# Patient Record
Sex: FEMALE | Race: WHITE | NOT HISPANIC OR LATINO | Employment: UNEMPLOYED | RURAL
[De-identification: names, ages, dates, MRNs, and addresses within clinical notes are randomized per-mention and may not be internally consistent; named-entity substitution may affect disease eponyms.]

---

## 2022-05-11 NOTE — PROGRESS NOTES
[facility] Cardiology   Consult  Rosaura Younger  1995    VISIT DATE:  05/12/22    PCP:   Tello South MD  [address] [facility] [address]        CC:  Hypertension, Chest Pain, Irregular Heart Beat, and Edema      Problem List:  1.  Asthma  2.  GERD  3.  Arthritis    -normal TSH and basic labs  -rheumatologic labs pending    Recent Holter monitor without significant arrhythmia    History of Present Illness:  Rosaura Younger  Is a 26 y.o. female with pertinent cardiac history detailed above.  Patient c/o dull ache in her heart.  Also c/o feeling a catch in her heart.  Resting can help the pain.  It I no always provoked with exertion.  This has been bothering her for a few months.  She also c/o heart racing intermittently.  Average episode of chest pains last 15-20 minutes.  In office today EKG shows T wave inversions in the inferior leads and nonspecific T wave flattening.  In no active distress she did wear a Holter monitor recently that showed sinus rhythm and sinus tachycardia.      There are no problems to display for this patient.      Allergies   Allergen Reactions   • Penicillins Other (See Comments)     CHILDHOOD       Social History     Socioeconomic History   • Marital status: Single   Tobacco Use   • Smoking status: Never Smoker   • Smokeless tobacco: Never Used   Substance and Sexual Activity   • Alcohol use: Not Currently   • Drug use: Not Currently   • Sexual activity: Defer       Family History   Problem Relation Age of Onset   • Cancer Mother    • No Known Problems Sister    • No Known Problems Sister    • No Known Problems Brother    • No Known Problems Brother        Current Medications:    Current Outpatient Medications:   •  Acetaminophen (ARTHRITIS PAIN PO), Take 650 mg by mouth Every Night., Disp: , Rfl:   •  cetirizine (zyrTEC) 10 MG tablet, Take 10 mg by mouth Daily., Disp: , Rfl:   •  ipratropium-albuterol (COMBIVENT RESPIMAT)  " MCG/ACT inhaler, Inhale 1 puff As Needed for Wheezing., Disp: , Rfl:   •  pantoprazole (PROTONIX) 40 MG EC tablet, Take 40 mg by mouth Daily., Disp: , Rfl:   •  Probiotic Product (PROBIOTIC ADVANCED PO), Take  by mouth Daily., Disp: , Rfl:   •  tiZANidine (ZANAFLEX) 4 MG tablet, Take 4 mg by mouth Daily., Disp: , Rfl:      Review of Systems   Cardiovascular: Positive for chest pain and palpitations.   Musculoskeletal: Positive for arthritis.       Vitals:    05/12/22 1340   BP: 127/90   BP Location: Right arm   Patient Position: Sitting   Pulse: 66   SpO2: 96%   Weight: 83.4 kg (183 lb 12.8 oz)   Height: 162.6 cm (64\")       Physical Exam  Constitutional:       Appearance: Normal appearance.   Neck:      Vascular: No carotid bruit.   Cardiovascular:      Rate and Rhythm: Normal rate and regular rhythm.      Pulses: Normal pulses.      Heart sounds: Normal heart sounds.   Pulmonary:      Effort: Pulmonary effort is normal.      Breath sounds: Normal breath sounds.   Musculoskeletal:      Right lower leg: No edema.      Left lower leg: No edema.   Neurological:      Mental Status: She is alert.         Diagnostic Data:    ECG 12 Lead    Date/Time: 5/12/2022 2:19 PM  Performed by: Chetan Garrison MD  Authorized by: Chetan Garrison MD   Comparison: not compared with previous ECG   Previous ECG: no previous ECG available  Rhythm: sinus rhythm  Rate: normal  BPM: 76  T inversion: III and aVF  QRS axis: normal  Other findings: non-specific ST-T wave changes          No results found for: CHLPL, TRIG, HDL, LDLDIRECT  No results found for: GLUCOSE, BUN, CREATININE, NA, K, CL, CO2, CREATININE, BUN  No results found for: HGBA1C  No results found for: WBC, HGB, HCT, PLT    Assessment:   Diagnosis Plan   1. Chest pain, unspecified type  Adult Stress Echo W/ Cont or Stress Agent if Necessary Per Protocol       Plan:      1.  Chest pain and palpitations  -Patient  has had normal baseline labs and TSH  -She " is getting a rheumatologic work-up for arthritis  -Holter monitor did not show any significant arrhythmias  -Since she has chest pain and nonspecific ST changes on EKG we will further evaluate with a stress echo    Contact her with the results and follow-up in 3 to 4 months          Chetan Garrison MD formerly [facility]

## 2022-05-12 ENCOUNTER — OFFICE VISIT (OUTPATIENT)
Dept: CARDIOLOGY | Facility: CLINIC | Age: 27
End: 2022-05-12

## 2022-05-12 VITALS
DIASTOLIC BLOOD PRESSURE: 90 MMHG | BODY MASS INDEX: 31.38 KG/M2 | WEIGHT: 183.8 LBS | SYSTOLIC BLOOD PRESSURE: 127 MMHG | OXYGEN SATURATION: 96 % | HEART RATE: 66 BPM | HEIGHT: 64 IN

## 2022-05-12 DIAGNOSIS — R07.9 CHEST PAIN, UNSPECIFIED TYPE: Primary | ICD-10-CM

## 2022-05-12 PROCEDURE — 99203 OFFICE O/P NEW LOW 30 MIN: CPT | Performed by: INTERNAL MEDICINE

## 2022-05-12 PROCEDURE — 93000 ELECTROCARDIOGRAM COMPLETE: CPT | Performed by: INTERNAL MEDICINE

## 2022-05-12 RX ORDER — CETIRIZINE HYDROCHLORIDE 10 MG/1
10 TABLET ORAL DAILY
COMMUNITY

## 2022-05-12 RX ORDER — TIZANIDINE 4 MG/1
4 TABLET ORAL DAILY
COMMUNITY
Start: 2022-04-18

## 2022-05-12 RX ORDER — PANTOPRAZOLE SODIUM 40 MG/1
40 TABLET, DELAYED RELEASE ORAL DAILY
COMMUNITY
Start: 2022-04-22

## 2022-06-09 DIAGNOSIS — R07.9 CHEST PAIN, UNSPECIFIED TYPE: Primary | ICD-10-CM

## 2022-06-15 ENCOUNTER — APPOINTMENT (OUTPATIENT)
Dept: CARDIOLOGY | Facility: HOSPITAL | Age: 27
End: 2022-06-15

## 2022-06-15 ENCOUNTER — HOSPITAL ENCOUNTER (OUTPATIENT)
Dept: CARDIOLOGY | Facility: HOSPITAL | Age: 27
Discharge: HOME | End: 2022-06-15
Admitting: INTERNAL MEDICINE

## 2022-06-15 VITALS
DIASTOLIC BLOOD PRESSURE: 77 MMHG | OXYGEN SATURATION: 97 % | HEART RATE: 103 BPM | BODY MASS INDEX: 31.24 KG/M2 | SYSTOLIC BLOOD PRESSURE: 117 MMHG | WEIGHT: 183 LBS | HEIGHT: 64 IN

## 2022-06-15 DIAGNOSIS — R07.9 CHEST PAIN, UNSPECIFIED TYPE: ICD-10-CM

## 2022-06-15 PROCEDURE — 93017 CV STRESS TEST TRACING ONLY: CPT

## 2022-06-15 PROCEDURE — 93018 CV STRESS TEST I&R ONLY: CPT | Performed by: INTERNAL MEDICINE

## 2022-06-16 LAB
BH CV STRESS BP STAGE 1: NORMAL
BH CV STRESS DURATION MIN STAGE 1: 3
BH CV STRESS DURATION MIN STAGE 2: 1
BH CV STRESS DURATION SEC STAGE 1: 0
BH CV STRESS DURATION SEC STAGE 2: 15
BH CV STRESS GRADE STAGE 1: 10
BH CV STRESS GRADE STAGE 2: 12
BH CV STRESS HR STAGE 1: 146
BH CV STRESS HR STAGE 2: 162
BH CV STRESS METS STAGE 1: 5
BH CV STRESS METS STAGE 2: 7.5
BH CV STRESS O2 STAGE 1: 97
BH CV STRESS O2 STAGE 2: 93
BH CV STRESS PROTOCOL 1: NORMAL
BH CV STRESS RECOVERY BP: NORMAL MMHG
BH CV STRESS RECOVERY HR: 92 BPM
BH CV STRESS RECOVERY O2: 96 %
BH CV STRESS SPEED STAGE 1: 1.7
BH CV STRESS SPEED STAGE 2: 2.5
BH CV STRESS STAGE 1: 1
BH CV STRESS STAGE 2: 2
MAXIMAL PREDICTED HEART RATE: 193 BPM
PERCENT MAX PREDICTED HR: 86.01 %
STRESS BASELINE BP: NORMAL MMHG
STRESS BASELINE HR: 89 BPM
STRESS O2 SAT REST: 96 %
STRESS PERCENT HR: 101 %
STRESS POST ESTIMATED WORKLOAD: 6.1 METS
STRESS POST EXERCISE DUR MIN: 4 MIN
STRESS POST EXERCISE DUR SEC: 15 SEC
STRESS POST O2 SAT PEAK: 93 %
STRESS POST PEAK BP: NORMAL MMHG
STRESS POST PEAK HR: 166 BPM
STRESS TARGET HR: 164 BPM

## 2022-06-20 ENCOUNTER — TELEPHONE (OUTPATIENT)
Dept: CARDIOLOGY | Facility: CLINIC | Age: 27
End: 2022-06-20

## 2022-06-20 NOTE — TELEPHONE ENCOUNTER
Her treadmill was read and finalized. Can you let me know if you have any concerns or further recommendations and I will those to her? Stress/echo was not completed. [employer] made us switch the order due to coverage. Thank you!

## 2022-09-08 ENCOUNTER — OFFICE VISIT (OUTPATIENT)
Dept: CARDIOLOGY | Facility: CLINIC | Age: 27
End: 2022-09-08

## 2022-09-08 VITALS
OXYGEN SATURATION: 98 % | HEIGHT: 64 IN | WEIGHT: 182 LBS | DIASTOLIC BLOOD PRESSURE: 80 MMHG | HEART RATE: 86 BPM | SYSTOLIC BLOOD PRESSURE: 124 MMHG | BODY MASS INDEX: 31.07 KG/M2

## 2022-09-08 DIAGNOSIS — R07.9 CHEST PAIN, UNSPECIFIED TYPE: Primary | ICD-10-CM

## 2022-09-08 PROCEDURE — 99214 OFFICE O/P EST MOD 30 MIN: CPT | Performed by: INTERNAL MEDICINE

## 2022-09-08 NOTE — PROGRESS NOTES
[facility] Cardiology  Follow Up Visit  Rosaura Younger  1995    VISIT DATE:  09/08/22    PCP:   Tello South [facility] [address]          CC:  Follow-up      Problem List:  1.  Asthma  2.  GERD  3.  Arthritis  4.  Fibromyalgia     -normal TSH and basic labs       Prior Holter monitor without significant arrhythmia    GXT June 2022  · The exercise ECG stress test was negative for ECG evidence of myocardial ischemia. The patient did not have chest pain with exercise stress. The Duke treadmill score was low at 4.3 due to the patient's exercise intolerance. The patient's exercise capacity was moderately to severely decreased (ERICA 53%). There was an exaggerated heart rate and hypertensive response to stress.  · Impressions are consistent with an intermediate risk stress test based on the Duke treadmill score.         History of Present Illness:  Rosaura Younger  Is a 26 y.o. female with pertinent cardiac history detailed above.  At initial visit patient was complaining of a dull ache in her chest.  She had baseline nonspecific ST changes.  A stress echo was planned but insurance would not cover so she was converted to a GXT.  She did not have any chest pain during her stress test and she had no ischemic EKG changes.  She did have a hypertensive response to exercise and impaired exercise capacity.  She contributed to decreased exercise tolerance to her asthma.  On follow-up today she reports feeling improved not having further chest pain.  She uses a combination of inhaler/nebulizer for asthma.  After rheumatology evaluation she was diagnosed with osteoarthritis as well as fibromyalgia.  Her resting blood pressure has not been significantly elevated.  Does not require medication at this time      There are no problems to display for this patient.      Allergies   Allergen Reactions   • Penicillins Rash       Social History     Socioeconomic  "History   • Marital status: Single   Tobacco Use   • Smoking status: Never Smoker   • Smokeless tobacco: Never Used   Substance and Sexual Activity   • Alcohol use: Not Currently   • Drug use: Not Currently   • Sexual activity: Defer       Family History   Problem Relation Age of Onset   • Cancer Mother    • No Known Problems Sister    • No Known Problems Sister    • No Known Problems Brother    • No Known Problems Brother        Current Medications:    Current Outpatient Medications:   •  Acetaminophen (ARTHRITIS PAIN PO), Take 650 mg by mouth Every Night., Disp: , Rfl:   •  cetirizine (zyrTEC) 10 MG tablet, Take 10 mg by mouth Daily., Disp: , Rfl:   •  ipratropium-albuterol (COMBIVENT RESPIMAT)  MCG/ACT inhaler, Inhale 1 puff As Needed for Wheezing., Disp: , Rfl:   •  pantoprazole (PROTONIX) 40 MG EC tablet, Take 40 mg by mouth Daily., Disp: , Rfl:   •  Probiotic Product (PROBIOTIC ADVANCED PO), Take  by mouth Daily., Disp: , Rfl:   •  tiZANidine (ZANAFLEX) 4 MG tablet, Take 4 mg by mouth Daily., Disp: , Rfl:      Review of Systems   Cardiovascular: Negative for chest pain and palpitations.   Respiratory: Positive for shortness of breath.        Vitals:    09/08/22 1356   BP: 124/80   BP Location: Left arm   Patient Position: Sitting   Pulse: 86   SpO2: 98%   Weight: 82.6 kg (182 lb)   Height: 162.6 cm (64\")       Physical Exam  Constitutional:       Appearance: Normal appearance.   Cardiovascular:      Rate and Rhythm: Normal rate and regular rhythm.      Pulses: Normal pulses.      Heart sounds: Normal heart sounds.   Musculoskeletal:      Right lower leg: No edema.      Left lower leg: No edema.   Skin:     General: Skin is warm and dry.   Neurological:      General: No focal deficit present.      Mental Status: She is alert.         Diagnostic Data:  Procedures  No results found for: CHLPL, TRIG, HDL, LDLDIRECT  No results found for: GLUCOSE, BUN, CREATININE, NA, K, CL, CO2, CREATININE, BUN  No results " found for: HGBA1C  No results found for: WBC, HGB, HCT, PLT    Assessment:  No diagnosis found.    Plan:      1.  Chest pain and palpitations  -Patient  has had normal baseline labs and TSH  -She reports diagnosed with fibromyalgia after rheumatology work-up  -Holter monitor did not show any significant arrhythmias  -GXT showed no evidence of ischemia, she had no chest pain.  She had impaired exercise tolerance which was thought to be secondary to asthma    She did have a high blood pressure response when exercising but resting BP has been normal.  Would observe off of medication at this time.    This time appears stable from a cardiology standpoint and she can follow with Dr. South.  She will follow-up with us as needed.        Chetan Garrison MD [facility]

## 2025-05-06 ENCOUNTER — APPOINTMENT (OUTPATIENT)
Dept: GENERAL RADIOLOGY | Facility: HOSPITAL | Age: 30
End: 2025-05-06
Payer: COMMERCIAL

## 2025-05-06 ENCOUNTER — HOSPITAL ENCOUNTER (EMERGENCY)
Facility: HOSPITAL | Age: 30
Discharge: HOME | End: 2025-05-06
Attending: STUDENT IN AN ORGANIZED HEALTH CARE EDUCATION/TRAINING PROGRAM | Admitting: STUDENT IN AN ORGANIZED HEALTH CARE EDUCATION/TRAINING PROGRAM
Payer: COMMERCIAL

## 2025-05-06 VITALS
DIASTOLIC BLOOD PRESSURE: 84 MMHG | HEART RATE: 71 BPM | TEMPERATURE: 97.7 F | RESPIRATION RATE: 18 BRPM | OXYGEN SATURATION: 99 % | WEIGHT: 192.6 LBS | HEIGHT: 64 IN | SYSTOLIC BLOOD PRESSURE: 125 MMHG | BODY MASS INDEX: 32.88 KG/M2

## 2025-05-06 DIAGNOSIS — R07.9 CHEST PAIN, UNSPECIFIED TYPE: Primary | ICD-10-CM

## 2025-05-06 LAB
ALBUMIN SERPL-MCNC: 4.4 G/DL (ref 3.5–5.2)
ALBUMIN/GLOB SERPL: 1.5 G/DL
ALP SERPL-CCNC: 77 U/L (ref 39–117)
ALT SERPL W P-5'-P-CCNC: 36 U/L (ref 1–33)
ANION GAP SERPL CALCULATED.3IONS-SCNC: 9.7 MMOL/L (ref 5–15)
AST SERPL-CCNC: 29 U/L (ref 1–32)
BASOPHILS # BLD AUTO: 0.06 10*3/MM3 (ref 0–0.2)
BASOPHILS NFR BLD AUTO: 0.6 % (ref 0–1.5)
BILIRUB SERPL-MCNC: 1 MG/DL (ref 0–1.2)
BUN SERPL-MCNC: 13 MG/DL (ref 6–20)
BUN/CREAT SERPL: 16.7 (ref 7–25)
CALCIUM SPEC-SCNC: 9.4 MG/DL (ref 8.6–10.5)
CHLORIDE SERPL-SCNC: 103 MMOL/L (ref 98–107)
CO2 SERPL-SCNC: 24.3 MMOL/L (ref 22–29)
CREAT SERPL-MCNC: 0.78 MG/DL (ref 0.57–1)
D DIMER PPP FEU-MCNC: <0.27 MCGFEU/ML (ref 0–0.5)
DEPRECATED RDW RBC AUTO: 39.2 FL (ref 37–54)
EGFRCR SERPLBLD CKD-EPI 2021: 104.9 ML/MIN/1.73
EOSINOPHIL # BLD AUTO: 0.15 10*3/MM3 (ref 0–0.4)
EOSINOPHIL NFR BLD AUTO: 1.5 % (ref 0.3–6.2)
ERYTHROCYTE [DISTWIDTH] IN BLOOD BY AUTOMATED COUNT: 12.8 % (ref 12.3–15.4)
FLUAV RNA RESP QL NAA+PROBE: NOT DETECTED
FLUBV RNA RESP QL NAA+PROBE: NOT DETECTED
GLOBULIN UR ELPH-MCNC: 3 GM/DL
GLUCOSE SERPL-MCNC: 98 MG/DL (ref 65–99)
HCT VFR BLD AUTO: 41.9 % (ref 34–46.6)
HGB BLD-MCNC: 14.3 G/DL (ref 12–15.9)
HOLD SPECIMEN: NORMAL
HOLD SPECIMEN: NORMAL
IMM GRANULOCYTES # BLD AUTO: 0.05 10*3/MM3 (ref 0–0.05)
IMM GRANULOCYTES NFR BLD AUTO: 0.5 % (ref 0–0.5)
LYMPHOCYTES # BLD AUTO: 2.98 10*3/MM3 (ref 0.7–3.1)
LYMPHOCYTES NFR BLD AUTO: 29.2 % (ref 19.6–45.3)
MAGNESIUM SERPL-MCNC: 1.9 MG/DL (ref 1.6–2.6)
MCH RBC QN AUTO: 29.1 PG (ref 26.6–33)
MCHC RBC AUTO-ENTMCNC: 34.1 G/DL (ref 31.5–35.7)
MCV RBC AUTO: 85.2 FL (ref 79–97)
MONOCYTES # BLD AUTO: 0.94 10*3/MM3 (ref 0.1–0.9)
MONOCYTES NFR BLD AUTO: 9.2 % (ref 5–12)
NEUTROPHILS NFR BLD AUTO: 59 % (ref 42.7–76)
NEUTROPHILS NFR BLD AUTO: 6.01 10*3/MM3 (ref 1.7–7)
NRBC BLD AUTO-RTO: 0 /100 WBC (ref 0–0.2)
PLATELET # BLD AUTO: 362 10*3/MM3 (ref 140–450)
PMV BLD AUTO: 10.3 FL (ref 6–12)
POTASSIUM SERPL-SCNC: 3.8 MMOL/L (ref 3.5–5.2)
PROT SERPL-MCNC: 7.4 G/DL (ref 6–8.5)
RBC # BLD AUTO: 4.92 10*6/MM3 (ref 3.77–5.28)
SARS-COV-2 RNA RESP QL NAA+PROBE: NOT DETECTED
SODIUM SERPL-SCNC: 137 MMOL/L (ref 136–145)
TROPONIN T SERPL HS-MCNC: <6 NG/L
WBC NRBC COR # BLD AUTO: 10.19 10*3/MM3 (ref 3.4–10.8)
WHOLE BLOOD HOLD COAG: NORMAL
WHOLE BLOOD HOLD SPECIMEN: NORMAL

## 2025-05-06 PROCEDURE — 80053 COMPREHEN METABOLIC PANEL: CPT | Performed by: STUDENT IN AN ORGANIZED HEALTH CARE EDUCATION/TRAINING PROGRAM

## 2025-05-06 PROCEDURE — 84484 ASSAY OF TROPONIN QUANT: CPT | Performed by: STUDENT IN AN ORGANIZED HEALTH CARE EDUCATION/TRAINING PROGRAM

## 2025-05-06 PROCEDURE — 25010000002 KETOROLAC TROMETHAMINE PER 15 MG

## 2025-05-06 PROCEDURE — 83735 ASSAY OF MAGNESIUM: CPT

## 2025-05-06 PROCEDURE — 71045 X-RAY EXAM CHEST 1 VIEW: CPT

## 2025-05-06 PROCEDURE — 85379 FIBRIN DEGRADATION QUANT: CPT

## 2025-05-06 PROCEDURE — 99284 EMERGENCY DEPT VISIT MOD MDM: CPT | Performed by: STUDENT IN AN ORGANIZED HEALTH CARE EDUCATION/TRAINING PROGRAM

## 2025-05-06 PROCEDURE — 87636 SARSCOV2 & INF A&B AMP PRB: CPT

## 2025-05-06 PROCEDURE — 36415 COLL VENOUS BLD VENIPUNCTURE: CPT

## 2025-05-06 PROCEDURE — 93005 ELECTROCARDIOGRAM TRACING: CPT | Performed by: STUDENT IN AN ORGANIZED HEALTH CARE EDUCATION/TRAINING PROGRAM

## 2025-05-06 PROCEDURE — 85025 COMPLETE CBC W/AUTO DIFF WBC: CPT | Performed by: STUDENT IN AN ORGANIZED HEALTH CARE EDUCATION/TRAINING PROGRAM

## 2025-05-06 PROCEDURE — 96374 THER/PROPH/DIAG INJ IV PUSH: CPT

## 2025-05-06 RX ORDER — KETOROLAC TROMETHAMINE 30 MG/ML
15 INJECTION, SOLUTION INTRAMUSCULAR; INTRAVENOUS ONCE
Status: COMPLETED | OUTPATIENT
Start: 2025-05-06 | End: 2025-05-06

## 2025-05-06 RX ORDER — ASPIRIN 325 MG
325 TABLET ORAL ONCE
Status: DISCONTINUED | OUTPATIENT
Start: 2025-05-06 | End: 2025-05-06

## 2025-05-06 RX ORDER — SODIUM CHLORIDE 0.9 % (FLUSH) 0.9 %
10 SYRINGE (ML) INJECTION AS NEEDED
Status: DISCONTINUED | OUTPATIENT
Start: 2025-05-06 | End: 2025-05-06 | Stop reason: HOSPADM

## 2025-05-06 RX ORDER — NAPROXEN 500 MG/1
500 TABLET ORAL 2 TIMES DAILY PRN
Qty: 20 TABLET | Refills: 0 | Status: SHIPPED | OUTPATIENT
Start: 2025-05-06

## 2025-05-06 RX ADMIN — KETOROLAC TROMETHAMINE 15 MG: 30 INJECTION, SOLUTION INTRAMUSCULAR; INTRAVENOUS at 10:32

## 2025-05-06 NOTE — ED PROVIDER NOTES
Subjective  History of Present Illness:    Patient is a 29-year-old female presented for evaluation of left-sided chest discomfort, she is currently being treated for UTI and reports that those symptoms have significantly improved, she reports that she has had left-sided chest discomfort ongoing since Saturday, intermittent in nature, not constant, worse with inhalation and breathing.  Denies any significant cough but does report some mild nasal congestion.  No fevers, no unilateral leg tenderness or swelling, she is not on any birth control or hormonal therapy.  Denies any prior history of DVT or PE.  Reports a dull ache when at rest, made worse with inspiration.  No traumatic injuries, no heavy lifting, not exacerbated by movement.  No numbness no tingling.      Nurses Notes reviewed and agree, including vitals, allergies, social history and prior medical history.     REVIEW OF SYSTEMS: All systems reviewed and not pertinent unless noted.  Review of Systems   Constitutional:  Negative for fever.   HENT:  Positive for congestion.    Respiratory:  Negative for cough and shortness of breath.    Cardiovascular:  Positive for chest pain. Negative for leg swelling.   Gastrointestinal:  Negative for abdominal pain, diarrhea, nausea and vomiting.   Musculoskeletal:  Negative for back pain.   All other systems reviewed and are negative.      Past Medical History:   Diagnosis Date    Abnormal ECG     Acid reflux     Arthritis     Asthma     C. difficile colitis     Easy bruising     Stomach disorder        Allergies:    Penicillins      Past Surgical History:   Procedure Laterality Date    REDUCTION MAMMAPLASTY      TONSILLECTOMY      WISDOM TOOTH EXTRACTION           Social History     Socioeconomic History    Marital status: Single   Tobacco Use    Smoking status: Never    Smokeless tobacco: Never   Substance and Sexual Activity    Alcohol use: Not Currently    Drug use: Not Currently    Sexual activity: Defer  "        Family History   Problem Relation Age of Onset    Cancer Mother     No Known Problems Sister     No Known Problems Sister     No Known Problems Brother     No Known Problems Brother        Objective  Physical Exam:  /84   Pulse 90   Temp 97.7 °F (36.5 °C) (Oral)   Resp 18   Ht 162.6 cm (64\")   Wt 87.4 kg (192 lb 9.6 oz)   LMP 04/28/2025 (Approximate)   SpO2 99%   BMI 33.06 kg/m²      Physical Exam  Vitals and nursing note reviewed.   Constitutional:       General: She is not in acute distress.     Appearance: She is well-developed. She is not ill-appearing, toxic-appearing or diaphoretic.   HENT:      Head: Normocephalic and atraumatic.   Eyes:      Extraocular Movements: Extraocular movements intact.      Pupils: Pupils are equal, round, and reactive to light.   Cardiovascular:      Rate and Rhythm: Normal rate and regular rhythm.      Pulses:           Radial pulses are 2+ on the right side and 2+ on the left side.      Heart sounds: Normal heart sounds.   Pulmonary:      Effort: Pulmonary effort is normal. No tachypnea or respiratory distress.      Breath sounds: Normal breath sounds. No decreased breath sounds, wheezing, rhonchi or rales.   Chest:      Chest wall: No tenderness.   Abdominal:      Palpations: Abdomen is soft.   Musculoskeletal:         General: Normal range of motion.      Cervical back: Normal range of motion.      Right lower leg: No tenderness. No edema.      Left lower leg: No tenderness. No edema.   Skin:     General: Skin is warm and dry.      Capillary Refill: Capillary refill takes less than 2 seconds.   Neurological:      General: No focal deficit present.      Mental Status: She is alert and oriented to person, place, and time.      Cranial Nerves: No cranial nerve deficit.      Motor: No weakness.   Psychiatric:         Mood and Affect: Mood normal.         Behavior: Behavior normal.               Procedures    ED Course:    ED Course as of 05/06/25 1137 Tue May " 06, 2025   1035 EKG interpreted by me, normal sinus rhythm no concerning ST changes noted, rate of 76 [JE]   1115 D-Dimer, Quant: <0.27 [JR]      ED Course User Index  [JE] Huang Keith MD  [JR] Krish Smith PA-C       Lab Results (last 24 hours)       Procedure Component Value Units Date/Time    CBC & Differential [[id number]]  (Abnormal) Collected: 05/06/25 1025    Specimen: Blood Updated: 05/06/25 1032    Narrative:      The following orders were created for panel order CBC & Differential.  Procedure                               Abnormality         Status                     ---------                               -----------         ------                     CBC Auto Differential[[id number]]        Abnormal            Final result                 Please view results for these tests on the individual orders.    Comprehensive Metabolic Panel [[id number]]  (Abnormal) Collected: 05/06/25 1025    Specimen: Blood Updated: 05/06/25 1049     Glucose 98 mg/dL      BUN 13 mg/dL      Creatinine 0.78 mg/dL      Sodium 137 mmol/L      Potassium 3.8 mmol/L      Chloride 103 mmol/L      CO2 24.3 mmol/L      Calcium 9.4 mg/dL      Total Protein 7.4 g/dL      Albumin 4.4 g/dL      ALT (SGPT) 36 U/L      AST (SGOT) 29 U/L      Alkaline Phosphatase 77 U/L      Total Bilirubin 1.0 mg/dL      Globulin 3.0 gm/dL      A/G Ratio 1.5 g/dL      BUN/Creatinine Ratio 16.7     Anion Gap 9.7 mmol/L      eGFR 104.9 mL/min/1.73     Narrative:      GFR Categories in Chronic Kidney Disease (CKD)              GFR Category          GFR (mL/min/1.73)    Interpretation  G1                    90 or greater        Normal or high (1)  G2                    60-89                Mild decrease (1)  G3a                   45-59                Mild to moderate decrease  G3b                   30-44                Moderate to severe decrease  G4                    15-29                Severe decrease  G5                    14 or less            Kidney failure    (1)In the absence of evidence of kidney disease, neither GFR category G1 or G2 fulfill the criteria for CKD.    eGFR calculation 2021 CKD-EPI creatinine equation, which does not include race as a factor    High Sensitivity Troponin T [[id number]]  (Normal) Collected: 05/06/25 1025    Specimen: Blood Updated: 05/06/25 1051     HS Troponin T <6 ng/L     Narrative:      High Sensitive Troponin T Reference Range:  <14.0 ng/L- Negative Female for AMI  <22.0 ng/L- Negative Male for AMI  >=14 - Abnormal Female indicating possible myocardial injury.  >=22 - Abnormal Male indicating possible myocardial injury.   Clinicians would have to utilize clinical acumen, EKG, Troponin, and serial changes to determine if it is an Acute Myocardial Infarction or myocardial injury due to an underlying chronic condition.         CBC Auto Differential [[id number]]  (Abnormal) Collected: 05/06/25 1025    Specimen: Blood Updated: 05/06/25 1032     WBC 10.19 10*3/mm3      RBC 4.92 10*6/mm3      Hemoglobin 14.3 g/dL      Hematocrit 41.9 %      MCV 85.2 fL      MCH 29.1 pg      MCHC 34.1 g/dL      RDW 12.8 %      RDW-SD 39.2 fl      MPV 10.3 fL      Platelets 362 10*3/mm3      Neutrophil % 59.0 %      Lymphocyte % 29.2 %      Monocyte % 9.2 %      Eosinophil % 1.5 %      Basophil % 0.6 %      Immature Grans % 0.5 %      Neutrophils, Absolute 6.01 10*3/mm3      Lymphocytes, Absolute 2.98 10*3/mm3      Monocytes, Absolute 0.94 10*3/mm3      Eosinophils, Absolute 0.15 10*3/mm3      Basophils, Absolute 0.06 10*3/mm3      Immature Grans, Absolute 0.05 10*3/mm3      nRBC 0.0 /100 WBC     Magnesium [[id number]]  (Normal) Collected: 05/06/25 1025    Specimen: Blood Updated: 05/06/25 1122     Magnesium 1.9 mg/dL     D-dimer, Quantitative [[id number]]  (Normal) Collected: 05/06/25 1027    Specimen: Blood Updated: 05/06/25 1111     D-Dimer, Quantitative <0.27 MCGFEU/mL     Narrative:      According to the assay 's published  "package insert, a normal (<0.50 MCGFEU/mL) D-dimer result in conjunction with a non-high clinical probability assessment, excludes deep vein thrombosis (DVT) and pulmonary embolism (PE) with high sensitivity.    D-dimer values increase with age and this can make VTE exclusion of an older population difficult. To address this, the American College of Phys[employer], based on best available evidence and recent guidelines, recommends that clinicians use age-adjusted D-dimer thresholds in patients greater than 50 years of age with: a) a low probability of PE who do not meet all Pulmonary Embolism Rule Out Criteria, or b) in those with intermediate probability of PE.   The formula for an age-adjusted D-dimer cut-off is \"age/100\".  For example, a 60 year old patient would have an age-adjusted cut-off of 0.60 MCGFEU/mL and an 80 year old 0.80 MCGFEU/mL.    COVID-19 and FLU A/B PCR, 1 HR TAT - Swab, Nasopharynx [[id number]]  (Normal) Collected: 05/06/25 1034    Specimen: Swab from Nasopharynx Updated: 05/06/25 1128     COVID19 Not Detected     Influenza A PCR Not Detected     Influenza B PCR Not Detected    Narrative:      Fact sheet for providers: https://www.fda.gov/media/576993/download    Fact sheet for patients: https://www.fda.gov/media/832212/download    Test performed by PCR.             XR Chest 1 View  Result Date: 5/6/2025  PROCEDURE: XR CHEST 1 VW-  HISTORY: Chest Pain Triage Protocol, stabbing, left-sided intermittent chest pain for 2 to 3 days.  COMPARISON: None.  FINDINGS: The heart is normal in size. The lungs are clear. The mediastinum is unremarkable. There is no pneumothorax.  There are no acute osseous abnormalities. Mild elevation right hemidiaphragm noted.      Impression: No acute cardiopulmonary process.      This report was signed and finalized on 5/6/2025 11:00 AM by Natalie Salcedo MD.           MDM      Initial impression of presenting illness: 29-year-old female presented for evaluation of left-sided " chest pain, anterior.  Nonradiating.    DDX: includes but is not limited to: Pleurisy, PE, pneumothorax, costochondritis, pneumonia, ACS, anxiety, panic attack, viral illness, bronchitis, pericarditis, musculoskeletal strain or sprain others    Patient arrives hemodynamically stable, afebrile, nontachycardic nontachypneic and nonhypoxic with vitals interpreted by myself.     Pertinent features from physical exam: No reproducible chest wall tenderness, no overlying erythema, lungs grossly clear throughout, cardiac auscultation regular and rhythm, no acute distress, 2+ radial pulses, cranial nerves II through XII are grossly intact without focal deficit..    Initial diagnostic plan: CBC CMP troponin D-dimer COVID flu magnesium chest x-ray    Results from initial plan were reviewed and interpreted by me revealing CBC and CMP are unremarkable, initial troponin less than 6, no indication for repeat given symptoms ongoing for greater than 4 hours, EKG revealed normal sinus rhythm with no concerning ST changes.  Current heart score of 0 low concern for ACS.  COVID flu is negative, D-dimer negative, low concern for PE, all labs are reassuring today.  Chest x-ray per my independent review no acute cardiopulmonary process, radiology overread with no acute process    Diagnostic information from other sources: Record reviewed, has followed up with cardiology in the past for unspecified chest pain.  Patient has also had a stress test in 2022 that was normal.    Interventions / Re-evaluation:   Medications   sodium chloride 0.9 % flush 10 mL (has no administration in time range)   ketorolac (TORADOL) injection 15 mg (15 mg Intravenous Given 5/6/25 1032)   Patient reassessed, feeling better at this time.    Results/clinical rationale were discussed with patient at the bedside, given worsening pain with inspiration, suspect likely a pleurisy component to her pain, recommended naproxen, with her recent history of congestion and mild  URI symptoms certainly could have been caused by a viral illness..  Follow-up with primary care physician.  Return precautions discussed.  Treat symptomatically and supportively.    Consultations/Discussion of results with other physicians: N/A    Disposition plan: Discharge.  -----    Final diagnoses:   Chest pain, unspecified type          Krish Smith PA-C  05/06/25 1139

## 2025-05-06 NOTE — DISCHARGE INSTRUCTIONS
Please follow-up with your primary care physician.  I have sent naproxen as needed for discomfort.  Return for significant worsening symptoms

## 2025-05-06 NOTE — Clinical Note
[facility] EMERGENCY DEPARTMENT  [address] [address]  Phone: [phone]    Rosaura Younger was seen and treated in our emergency department on 5/6/2025.  She may return to work on 05/07/2025.         Thank you for choosing [facility].    Krish Smith PA-C

## 2025-08-13 ENCOUNTER — TELEPHONE (OUTPATIENT)
Dept: CARDIOLOGY | Facility: CLINIC | Age: 30
End: 2025-08-13
Payer: COMMERCIAL

## 2025-08-21 ENCOUNTER — OFFICE VISIT (OUTPATIENT)
Dept: CARDIOLOGY | Facility: CLINIC | Age: 30
End: 2025-08-21
Payer: COMMERCIAL

## 2025-08-21 VITALS
WEIGHT: 185.5 LBS | BODY MASS INDEX: 31.67 KG/M2 | SYSTOLIC BLOOD PRESSURE: 110 MMHG | HEIGHT: 64 IN | HEART RATE: 84 BPM | DIASTOLIC BLOOD PRESSURE: 81 MMHG | OXYGEN SATURATION: 98 %

## 2025-08-21 DIAGNOSIS — R07.9 CHEST PAIN, UNSPECIFIED TYPE: Primary | ICD-10-CM

## 2025-08-21 RX ORDER — ONDANSETRON 8 MG/1
TABLET, FILM COATED ORAL AS NEEDED
COMMUNITY
Start: 2025-07-31

## 2025-08-21 RX ORDER — ALBUTEROL SULFATE 90 UG/1
AEROSOL, METERED RESPIRATORY (INHALATION)
COMMUNITY
Start: 2025-07-30

## 2025-08-21 RX ORDER — PREDNISONE 10 MG/1
TABLET ORAL AS NEEDED
COMMUNITY
Start: 2025-08-20

## 2025-08-21 RX ORDER — FLUTICASONE PROPIONATE 50 MCG
SPRAY, SUSPENSION (ML) NASAL
COMMUNITY
Start: 2025-08-04